# Patient Record
(demographics unavailable — no encounter records)

---

## 2017-01-01 NOTE — [facility]
Subjective


Stated Complaint: EAR PAIN


History of Present Illness: 





Pulling at right ear over the past two days.  No fever, runny nose or cough.  

Eating and drinking well.





No known sick contacts.  No smokers.  No day care.





Vaccines are up to date.





Past Medical History


Smoking Status (MU): Never Smoked Tobacco


Household Exposure: No


Tobacco Cessation Information Provided: Patient Declined


Weight: 9.001 kg


Vital Signs: 


 Vital Signs











  11/29/17





  17:16


 


Temperature 97.7 F


 


Pulse Rate 104


 


Respiratory 16





Rate 











Home Medications: 


 Home Medications











 Medication  Instructions  Recorded  Confirmed  Type


 


Tylenol  PED LIQ UDC* 1.25 ml PO PRN 11/29/17  History














Physical Exam


General Appearance: alert, comfortable


Hydration Status: mucous membranes moist


Head: normocephalic


Conjunctivae: normal


Ears: normal


Tympanic Membranes: normal


Mouth: normal buccal mucosa, normal teeth and gums, normal tongue


Mouth Description: 





Mandibular incisors erupted.


Throat: normal tonsils, normal posterior pharynx


Neck: supple


Cervical Lymph Nodes: no enlargement


Lungs: Clear to auscultation


Heart: S1 and S2 normal, no gallops, no rubs


Assessment: 





Right otalgia: No pathology found. Suspect teething with referred otalgia.


Plan: 





NSAIDs as directed.  Teething ring for further comfort.  Call with any fever, 

worsening pain, or additional concerns.

## 2018-10-07 NOTE — [facility]
Subjective


Stated Complaint: FEVER,VOMITING


History of Present Illness: 





congestion, tearing, cough x 3 days. NBNB vomiting no diarrhea. fever since 

last pm. drinking well. decreased appetite


no sick contacts. 


no 


pmh: term baby, no medical problems. no hospt no surgeries


imm utd


no flu shot yet. 


Previously well 17 month old with normal growth and development presents with 3 

day h/o clear rhinorrhea, congestion, cough and increased tearing. He develop 

NBNB vomiting and fever since last pm. no diarrhea. no rash. Drinking well, 

decreased appetite. Since this am has had increased wob and audible expiratory 

wheeze.  no h/o asthma/eczema/allergy  maternal aunt with asthma. 





Past Medical History


Smoking Status (MU): Never Smoked Tobacco


Household Exposure: No


Tobacco Cessation Information Provided: N/A Due to Patient Condition


Weight: 11.34 kg


Vital Signs: 


 Vital Signs











  10/07/18





  10:34


 


Temperature 99 F


 


Pulse Rate 146


 


Respiratory 33





Rate 


 


O2 Sat by Pulse 99





Oximetry 











Home Medications: 


 Home Medications











 Medication  Instructions  Recorded  Confirmed  Type


 


Tylenol  PED LIQ UDC* 1.25 ml PO PRN 11/29/17  History


 


Albuterol 2.5MG/3ML (0.083%)* 2.5 mg INH Q4H #24 vial 10/07/18  Rx





[Ventolin 2.5 MG/3 ML NEB.SOL*]    


 


Motrin Ib 1.8 ml PO 10/07/18  History


 


Nebulizer [Compact Compressor 1 mis XX DAILY #1 mis 10/07/18  Rx





Nebulizer]    


 


PrednisoLONE 3 MG/ML ORAL.SOLU 15 mg PO DAILY #25 ml 10/07/18  Rx





[PrednisoLONE 3 MG/ML 5 ml    





ORAL.SOLUTION*]    











Prescriptions: 


Albuterol 2.5MG/3ML (0.083%)* [Ventolin 2.5 MG/3 ML NEB.SOL*] 2.5 mg INH Q4H #

24 vial


Nebulizer [Compact Compressor Nebulizer] 1 mis XX DAILY #1 mis


PrednisoLONE 3 MG/ML ORAL.SOLU [PrednisoLONE 3 MG/ML 5 ml ORAL.SOLUTION*] 15 mg 

PO DAILY #25 ml